# Patient Record
Sex: MALE | Race: WHITE | URBAN - METROPOLITAN AREA
[De-identification: names, ages, dates, MRNs, and addresses within clinical notes are randomized per-mention and may not be internally consistent; named-entity substitution may affect disease eponyms.]

---

## 2017-01-01 ENCOUNTER — TELEPHONE (OUTPATIENT)
Dept: FAMILY MEDICINE | Facility: CLINIC | Age: 82
End: 2017-01-01

## 2017-01-01 ENCOUNTER — OFFICE VISIT (OUTPATIENT)
Dept: FAMILY MEDICINE | Facility: CLINIC | Age: 82
End: 2017-01-01
Payer: COMMERCIAL

## 2017-01-01 ENCOUNTER — MEDICAL CORRESPONDENCE (OUTPATIENT)
Dept: HEALTH INFORMATION MANAGEMENT | Facility: CLINIC | Age: 82
End: 2017-01-01

## 2017-01-01 ENCOUNTER — CARE COORDINATION (OUTPATIENT)
Dept: CASE MANAGEMENT | Facility: CLINIC | Age: 82
End: 2017-01-01

## 2017-01-01 ENCOUNTER — RADIANT APPOINTMENT (OUTPATIENT)
Dept: MRI IMAGING | Facility: CLINIC | Age: 82
End: 2017-01-01
Attending: PSYCHIATRY & NEUROLOGY
Payer: COMMERCIAL

## 2017-01-01 ENCOUNTER — OFFICE VISIT (OUTPATIENT)
Dept: NEUROLOGY | Facility: CLINIC | Age: 82
End: 2017-01-01
Payer: COMMERCIAL

## 2017-01-01 ENCOUNTER — OFFICE VISIT (OUTPATIENT)
Dept: NEUROLOGY | Facility: CLINIC | Age: 82
End: 2017-01-01
Attending: FAMILY MEDICINE
Payer: COMMERCIAL

## 2017-01-01 ENCOUNTER — TELEPHONE (OUTPATIENT)
Dept: NEUROLOGY | Facility: CLINIC | Age: 82
End: 2017-01-01

## 2017-01-01 ENCOUNTER — RADIANT APPOINTMENT (OUTPATIENT)
Dept: GENERAL RADIOLOGY | Facility: CLINIC | Age: 82
End: 2017-01-01
Attending: FAMILY MEDICINE
Payer: COMMERCIAL

## 2017-01-01 VITALS
SYSTOLIC BLOOD PRESSURE: 148 MMHG | WEIGHT: 153 LBS | BODY MASS INDEX: 21.34 KG/M2 | HEART RATE: 85 BPM | DIASTOLIC BLOOD PRESSURE: 78 MMHG

## 2017-01-01 VITALS
SYSTOLIC BLOOD PRESSURE: 142 MMHG | DIASTOLIC BLOOD PRESSURE: 75 MMHG | HEART RATE: 64 BPM | OXYGEN SATURATION: 99 % | TEMPERATURE: 97 F

## 2017-01-01 VITALS — BODY MASS INDEX: 21.3 KG/M2 | SYSTOLIC BLOOD PRESSURE: 165 MMHG | WEIGHT: 152.7 LBS | DIASTOLIC BLOOD PRESSURE: 76 MMHG

## 2017-01-01 VITALS
BODY MASS INDEX: 21.2 KG/M2 | DIASTOLIC BLOOD PRESSURE: 73 MMHG | HEART RATE: 82 BPM | SYSTOLIC BLOOD PRESSURE: 131 MMHG | WEIGHT: 152 LBS | OXYGEN SATURATION: 98 % | TEMPERATURE: 97.2 F

## 2017-01-01 VITALS
TEMPERATURE: 97 F | WEIGHT: 150 LBS | BODY MASS INDEX: 20.92 KG/M2 | DIASTOLIC BLOOD PRESSURE: 76 MMHG | HEART RATE: 88 BPM | SYSTOLIC BLOOD PRESSURE: 132 MMHG

## 2017-01-01 DIAGNOSIS — Z53.9 DIAGNOSIS NOT YET DEFINED: Primary | ICD-10-CM

## 2017-01-01 DIAGNOSIS — G89.29 CHRONIC MIDLINE LOW BACK PAIN WITHOUT SCIATICA: ICD-10-CM

## 2017-01-01 DIAGNOSIS — G31.9 DIFFUSE CEREBRAL ATROPHY (H): Primary | ICD-10-CM

## 2017-01-01 DIAGNOSIS — R82.90 ABNORMAL URINALYSIS: ICD-10-CM

## 2017-01-01 DIAGNOSIS — R26.9 GAIT DISORDER: ICD-10-CM

## 2017-01-01 DIAGNOSIS — R26.9 GAIT DISORDER: Primary | ICD-10-CM

## 2017-01-01 DIAGNOSIS — G30.1 LATE ONSET ALZHEIMER'S DISEASE WITH BEHAVIORAL DISTURBANCE (H): ICD-10-CM

## 2017-01-01 DIAGNOSIS — M54.50 CHRONIC MIDLINE LOW BACK PAIN WITHOUT SCIATICA: Primary | ICD-10-CM

## 2017-01-01 DIAGNOSIS — M54.50 CHRONIC MIDLINE LOW BACK PAIN WITHOUT SCIATICA: ICD-10-CM

## 2017-01-01 DIAGNOSIS — G30.9 ALZHEIMER'S DEMENTIA WITHOUT BEHAVIORAL DISTURBANCE, UNSPECIFIED TIMING OF DEMENTIA ONSET: Primary | ICD-10-CM

## 2017-01-01 DIAGNOSIS — F02.818 LATE ONSET ALZHEIMER'S DISEASE WITH BEHAVIORAL DISTURBANCE (H): ICD-10-CM

## 2017-01-01 DIAGNOSIS — K21.00 GASTROESOPHAGEAL REFLUX DISEASE WITH ESOPHAGITIS: ICD-10-CM

## 2017-01-01 DIAGNOSIS — R29.6 RECURRENT FALLS: ICD-10-CM

## 2017-01-01 DIAGNOSIS — F02.80 ALZHEIMER'S DEMENTIA WITHOUT BEHAVIORAL DISTURBANCE, UNSPECIFIED TIMING OF DEMENTIA ONSET: Primary | ICD-10-CM

## 2017-01-01 DIAGNOSIS — G89.29 CHRONIC MIDLINE LOW BACK PAIN WITHOUT SCIATICA: Primary | ICD-10-CM

## 2017-01-01 LAB
ALBUMIN UR-MCNC: NEGATIVE MG/DL
APPEARANCE UR: CLEAR
BACTERIA SPEC CULT: NORMAL
BILIRUB UR QL STRIP: NEGATIVE
COLOR UR AUTO: YELLOW
GLUCOSE UR STRIP-MCNC: NEGATIVE MG/DL
HGB UR QL STRIP: NEGATIVE
HYALINE CASTS #/AREA URNS LPF: ABNORMAL /LPF
KETONES UR STRIP-MCNC: NEGATIVE MG/DL
LEUKOCYTE ESTERASE UR QL STRIP: ABNORMAL
METHYLMALONATE SERPL-SCNC: 0.42
NITRATE UR QL: NEGATIVE
NON-SQ EPI CELLS #/AREA URNS LPF: ABNORMAL /LPF
PH UR STRIP: 6 PH (ref 5–7)
RBC #/AREA URNS AUTO: ABNORMAL /HPF
SOURCE: ABNORMAL
SP GR UR STRIP: 1.02 (ref 1–1.03)
SPECIMEN SOURCE: NORMAL
TSH SERPL DL<=0.05 MIU/L-ACNC: 4.83 MU/L (ref 0.4–4)
UROBILINOGEN UR STRIP-ACNC: 0.2 EU/DL (ref 0.2–1)
VIT B12 SERPL-MCNC: 484 PG/ML (ref 193–986)
WBC #/AREA URNS AUTO: ABNORMAL /HPF

## 2017-01-01 PROCEDURE — 99203 OFFICE O/P NEW LOW 30 MIN: CPT | Performed by: PSYCHIATRY & NEUROLOGY

## 2017-01-01 PROCEDURE — 99213 OFFICE O/P EST LOW 20 MIN: CPT | Performed by: PSYCHIATRY & NEUROLOGY

## 2017-01-01 PROCEDURE — 87086 URINE CULTURE/COLONY COUNT: CPT | Performed by: FAMILY MEDICINE

## 2017-01-01 PROCEDURE — 99213 OFFICE O/P EST LOW 20 MIN: CPT | Performed by: FAMILY MEDICINE

## 2017-01-01 PROCEDURE — 84443 ASSAY THYROID STIM HORMONE: CPT | Performed by: PSYCHIATRY & NEUROLOGY

## 2017-01-01 PROCEDURE — 36415 COLL VENOUS BLD VENIPUNCTURE: CPT | Performed by: PSYCHIATRY & NEUROLOGY

## 2017-01-01 PROCEDURE — 72100 X-RAY EXAM L-S SPINE 2/3 VWS: CPT

## 2017-01-01 PROCEDURE — 99214 OFFICE O/P EST MOD 30 MIN: CPT | Performed by: FAMILY MEDICINE

## 2017-01-01 PROCEDURE — 81001 URINALYSIS AUTO W/SCOPE: CPT | Performed by: FAMILY MEDICINE

## 2017-01-01 PROCEDURE — 83921 ORGANIC ACID SINGLE QUANT: CPT | Mod: 90 | Performed by: PSYCHIATRY & NEUROLOGY

## 2017-01-01 PROCEDURE — 70551 MRI BRAIN STEM W/O DYE: CPT | Performed by: RADIOLOGY

## 2017-01-01 PROCEDURE — 82607 VITAMIN B-12: CPT | Performed by: PSYCHIATRY & NEUROLOGY

## 2017-01-01 PROCEDURE — 99000 SPECIMEN HANDLING OFFICE-LAB: CPT | Performed by: PSYCHIATRY & NEUROLOGY

## 2017-01-01 RX ORDER — ACETAMINOPHEN 500 MG
1000 TABLET ORAL EVERY 6 HOURS PRN
Qty: 100 TABLET | Refills: 0 | COMMUNITY
Start: 2017-01-01

## 2017-01-01 RX ORDER — OMEPRAZOLE 40 MG/1
40 CAPSULE, DELAYED RELEASE ORAL EVERY MORNING
Qty: 90 CAPSULE | Refills: 3 | Status: SHIPPED | OUTPATIENT
Start: 2017-01-01

## 2017-02-22 NOTE — MR AVS SNAPSHOT
"              After Visit Summary   2/22/2017    Darwin Curtis    MRN: [id number]           Patient Information     Date Of Birth          5/4/1923        Visit Information        Provider Department      2/22/2017 1:45 PM Frantz King MD [facility]        Care Instructions    Your provider has referred you to: [facility]: [facility] [phone]   [email or web address]    Call this number to schedule the neurology appointment(s) for further evaluation of your falls       Use a wedge pillow to sleep on.  They are available at [employer] or medical supply stores        Follow-ups after your visit        Who to contact     If you have questions or need follow up information about today's clinic visit or your schedule please contact [facility] directly at [phone].  Normal or non-critical lab and imaging results will be communicated to you by MyChart, letter or phone within 4 business days after the clinic has received the results. If you do not hear from us within 7 days, please contact the clinic through [employer] or phone. If you have a critical or abnormal lab result, we will notify you by phone as soon as possible.  Submit refill requests through [employer] or call your pharmacy and they will forward the refill request to us. Please allow 3 business days for your refill to be completed.          Additional Information About Your Visit        [employer] Information     [employer] lets you send messages to your doctor, view your test results, renew your prescriptions, schedule appointments and more. To sign up, go to [email or web address] . Click on \"Log in\" on the left side of the screen, which will take you to the Welcome page. Then click on \"Sign up Now\" on the right side of the page.     You will be asked to enter the access code listed below, as well as some personal information. Please follow " the directions to create your username and password.     Your access code is: [email or web address]  Expires: 2017  2:26 PM     Your access code will  in 90 days. If you need help or a new code, please call your [facility] clinic or [phone].        Care EveryWhere ID     This is your Care EveryWhere ID. This could be used by other organizations to access your [facility] medical records  [id number]        Your Vitals Were     Pulse Temperature BMI (Body Mass Index)             88 97  F (36.1  C) (Oral) 20.92 kg/m2          Blood Pressure from Last 3 Encounters:   17 132/76   16 132/70   16 145/77    Weight from Last 3 Encounters:   17 150 lb (68 kg)   16 147 lb (66.7 kg)   10/03/16 151 lb (68.5 kg)              Today, you had the following     No orders found for display       Primary Care Provider Office Phone # Fax #    Farntz King [phone]       [facility] [address] [facility] [address]        Thank you!     Thank you for choosing [facility]  for your care. Our goal is always to provide you with excellent care. Hearing back from our patients is one way we can continue to improve our services. Please take a few minutes to complete the written survey that you may receive in the mail after your visit with us. Thank you!             Your Updated Medication List - Protect others around you: Learn how to safely use, store and throw away your medicines at www.disposemymeds.org.          This list is accurate as of: 17  2:26 PM.  Always use your most recent med list.                   Brand Name Dispense Instructions for use    amLODIPine 10 MG tablet    NORVASC    90 tablet    Take 1 tablet (10 mg) by mouth daily       aspirin 81 MG tablet      1 TABLET DAILY       cholecalciferol 1000 UNIT tablet    vitamin D     Take 1,000 Units by mouth daily       metoprolol 100 MG 24 hr tablet    TOPROL-XL    90 tablet    Take 1  tablet (100 mg) by mouth daily       Multi-vitamin Tabs tablet      Take 1 tablet by mouth daily       order for DME     1 Device    Please dispense one pair of crutches

## 2017-02-22 NOTE — PATIENT INSTRUCTIONS
Your provider has referred you to: [facility]: [facility] [phone]   [email or web address]    Call this number to schedule the neurology appointment(s) for further evaluation of your falls       Use a wedge pillow to sleep on.  They are available at [employer] or medical supply stores

## 2017-02-22 NOTE — PROGRESS NOTES
SUBJECTIVE:  Darwin Curtis is a 89 year old male here for removal of sutures .   The laceration of left frontal scalp  occurred 5 days ago.  The sutures were placed that day during a visit at [facility] .   The patient denies any problems with the wound. he denies fever or any drainage.    OBJECTIVE: There were 5 simple interrupted sutures in the laceration. The wound appears well healed.     ASSESSMENT: Laceration follow up in care. The sutureswas/were removed without problems.    PLAN: Keep the wound moist with antibiotic ointment and covered until it is well healed. Avoid submersion of and stress on the wound until it is well healed. Follow up as needed.    --------------------------------------------------------------------------------------------------------------------------------------  SUBJECTIVE:  Darwin Curtis is a 89 year old male who scheduled an appointment to discuss the following issues:  He had anothert fall this morning. He reports that he does not know why he is having these recurrent falls.  He denies any injury from his fall this morning.   He denies any specific weakness.    Past Medical, social, family histories, medications, and allergies reviewed and updated      Current Outpatient Prescriptions:      metoprolol (TOPROL-XL) 100 MG 24 hr tablet, Take 1 tablet (100 mg) by mouth daily, Disp: 90 tablet, Rfl: 3     amLODIPine (NORVASC) 10 MG tablet, Take 1 tablet (10 mg) by mouth daily, Disp: 90 tablet, Rfl: 3     order for DME, Please dispense one pair of crutches, Disp: 1 Device, Rfl: 0     cholecalciferol (VITAMIN D) 1000 UNIT tablet, Take 1,000 Units by mouth daily, Disp: , Rfl:      multivitamin, therapeutic with minerals (MULTI-VITAMIN) TABS, Take 1 tablet by mouth daily, Disp: , Rfl:      ASPIRIN 81 MG OR TABS, 1 TABLET DAILY, Disp: , Rfl:     OBJECTIVE:  /76 (BP Location: Right arm, Cuff Size: Adult Regular)  Pulse 88  Temp 97  F (36.1  C) (Oral)  Wt 150 lb (68 kg)  BMI 20.92  kg/m2    EXAM:  GENERAL APPEARANCE: healthy, alert and no distress    NEURO: he has no poverty of speech but his mechanical production of sounds seems abnormal    Normal strength and tone, sensory exam grossly normal, mentation intact,   Positive dysdiadochokinesis  Finger to finger was slow and lacked coordination        ASSESSMENT/PLAN:  I reviewed my last note, when I evaluated the patient for this I believe he is having a balance issue and I would once again asked that they be seen by neurology. The information for the referral is given to the patient and his wife.

## 2017-02-22 NOTE — NURSING NOTE
"Chief Complaint   Patient presents with     Suture Removal       Initial /76 (BP Location: Right arm, Cuff Size: Adult Regular)  Pulse 88  Temp 97  F (36.1  C) (Oral)  Wt 150 lb (68 kg)  BMI 20.92 kg/m2 Estimated body mass index is 20.92 kg/(m^2) as calculated from the following:    Height as of 7/6/15: 5' 11\" (1.803 m).    Weight as of this encounter: 150 lb (68 kg).  Medication Reconciliation: sima Walker M.A.    "

## 2017-02-28 NOTE — LETTER
"2/28/2017       RE: Darwin Curtis  [address] [place] [address]     Dear Colleague,    Thank you for referring your patient, Darwin Curtis[facility]. Please see a copy of my visit note below.    HISTORY OF PRESENT ILLNESS:  Darwin Curtis is a 89-year-old right-handed man seen at the request of Dr. King for neurologic consultation with chief complaint of gait imbalance.  He is here with his wife, Hansa.  He has been falling down.  It happens in the house at least a couple of times each week.  She says that he \"does not  his feet.\"  He says that when he does fall he \"cannot get up off the floor.\"  His son lives 1/4 mile away and comes over and helps.  He can get out of a chair with the use of his arms.  He has no problem rolling over in bed.  He does not have tremor.  His handwriting has deteriorated in the last year or so.  Also, his wife has noticed that his speech has gotten a headache in the last year.  He has problems swallowing he says because his mouth was dry.  He thinks that he is weak in his arms and that he is \"losing all over.\"  He has had problems with urinary incontinence and is seeing Urology in this regard.  He denies neck pain or low back pain.  In the fall of last year he was walking 100 feet to the mailbox, but has not been doing that this winter.  His wife thinks that he might be able to do that once the weather gets better.  His vision is fading.      PAST MEDICAL HISTORY:  Significant for high blood pressure.  He denies diabetes, thyroid or asthma.  He did have a fall recently and had to have stitches placed in his forehead.  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  He does not drink or smoke and has no history of other head trauma except as related to the falls as noted.      SOCIAL HISTORY:  He is a retired .  They have 1 " "child.      FAMILY HISTORY:  Noncontributory.      PHYSICAL EXAMINATION:   GENERAL:  The patient is cooperative and in no distress.   VITAL SIGNS:  His blood pressure is 165/76.   NECK:  There are no carotid bruits.   HEART:  Auscultation of the heart shows S1, S2, without murmur, rub or gallop.   CHEST:  Clear to auscultation.   NEUROLOGIC:  He scored a 14/21 on a modified Mini-Mental Status.  He had problems with orientation, especially to date and less so to place.  He made mistakes spelling the word \"world\" backwards.  He could recall 2 of 3 words at 3 minutes with no clues but then could not remember the third word even with a clue.  Cranial nerve testing shows full visual fields to confrontation.  Funduscopic exam shows a sharp disk on the right.  I could not adequately see it on the left.  He does have evidence for amblyopia with the right eye tending to show esotropia.  Otherwise, eye movements are conjugate.  Pupils react sluggishly if at all to light.  Fundus on the left could not be adequately visualized.  Facies are symmetric.  His voice has a dysarthric quality.  Tongue protrudes in the midline.  Motor evaluation shows no pronator drift.  Finger tapping appears symmetric and finger-nose-finger is without ataxia.  He has weakness proximally in the left arm at the shoulder but this could be a shoulder problem.  Otherwise, he seems to have well-preserved strength in elbow flexion and extension and in wrist extension and in his fingers as well.  Strength in the legs and feet appears normal.  Muscle stretch reflexes are present and symmetric.  Toes are downgoing.  Sensory exam shows marked impairment and vibration at toes and at the ankles bilaterally.  Temperature sense cannot be adequately tested because of difficulty with his hearing.  Modalities to temperature and vibration in the hands are preserved.  His gait has a staggering quality.  I walked with him in the hallway and was assisting him holding onto " his 1 arm.  His right leg suddenly seemed to catch or give out on him and he stumbled off to the right side.  He did not fall but his wife says this is the sort of thing that happens.      ASSESSMENT:  Gait disorder.      DISCUSSION:  This patient is seen for evaluation of gait disorder and falls.  His exam does show him to walk on a somewhat wide base with a staggering kind of gait.  He has marked impairment of vibratory sense in the toes.  This could indicate posterior column dysfunction which could contribute to gait disorder.  For evaluation I am going to obtain MRI imaging of the brain to rule out any structural lesions.  I will be checking labs including B12 and methylmalonic acid and TSH.  I will see him in followup in a few weeks for reexamination.         RAFA LONGO MD             D: 2017 15:00   T: 2017 16:11   MT: #150      Name:     JANICE PEREZ   MRN:      [id number]        Account:      [id number]   :      1923           Visit Date:   2017      Document: [id number]        3/1 Addendum: Reviewed labs with wife.  B12 480, TSH slightly elevated at 4.8.  Awaiting MRI.     Again, thank you for allowing me to participate in the care of your patient.      Sincerely,    Rafa Longo MD

## 2017-02-28 NOTE — NURSING NOTE
"Darwin Curtis's goals for this visit include:   Chief Complaint   Patient presents with     Consult     frequent falls        He requests these members of his care team be copied on today's visit information: yes     PCP: Frantz King    Referring Provider:  Frantz King MD  [facility]  [address] [place] [address]    Chief Complaint   Patient presents with     Consult     frequent falls        Initial /76  Wt 69.3 kg (152 lb 11.2 oz)  BMI 21.3 kg/m2 Estimated body mass index is 21.3 kg/(m^2) as calculated from the following:    Height as of 7/6/15: 1.803 m (5' 11\").    Weight as of this encounter: 69.3 kg (152 lb 11.2 oz).  Medication Reconciliation: complete    Do you need any medication refills at today's visit?       "

## 2017-02-28 NOTE — MR AVS SNAPSHOT
After Visit Summary   2/28/2017    Darwin Curtis    MRN: [id number]           Patient Information     Date Of Birth          5/4/1923        Visit Information        Provider Department      2/28/2017 2:00 PM Ephraim Longo MD [facility]        Today's Diagnoses     Gait disorder    -  1      Care Instructions    Please wear comfortable clothes on the day of MRI. No metal or valuables.         Follow-ups after your visit        Follow-up notes from your care team     Return in about 4 weeks (around 3/28/2017).      Your next 10 appointments already scheduled     Mar 09, 2017 11:00 AM CST   MR BRAIN W/O CONTRAST with MGMR1   [facility] ([facility]    [address] [place] [address] [phone]           Take your medicines as usual, unless your doctor tells you not to. Bring a list of your current medicines to your exam (including vitamins, minerals and over-the-counter drugs). Also bring the results of similar scans you may have had.  Please remove any body piercings and hair extensions before you arrive.  Follow your doctor s orders. If you do not, we may have to postpone your exam.  You will not have contrast for this exam. You do not need to do anything special to prepare.  The MRI machine uses a strong magnet. Please wear clothes without metal (snaps, zippers). A sweatsuit works well, or we may give you a hospital gown.   **IMPORTANT** THE INSTRUCTIONS BELOW ARE ONLY FOR THOSE PATIENTS WHO HAVE BEEN TOLD THEY WILL RECEIVE SEDATION OR GENERAL ANESTHESIA DURING THEIR MRI PROCEDURE:  IF YOU WILL RECEIVE SEDATION (take medicine to help you relax during your exam):   You must get the medicine from your doctor before you arrive. Bring the medicine to the exam. Do not take it at home.   Arrive one hour early. Bring someone who can take you home after the test. Your medicine will make you sleepy. After the exam, you may not  drive, take a bus or take a taxi by yourself.   No eating 8 hours before your exam. You may have clear liquids up until 4 hours before your exam. (Clear liquids include water, clear tea, black coffee and fruit juice without pulp.)  IF YOU WILL RECEIVE ANESTHESIA (be asleep for your exam):   Arrive 1 1/2 hours early. Bring someone who can take you home after the test. You may not drive, take a bus or take a taxi by yourself.   No eating 8 hours before your exam. You may have clear liquids up until 4 hours before your exam. (Clear liquids include water, clear tea, black coffee and fruit juice without pulp.)   You will spend four to five hours in the recovery room.  Please call the Imaging Department at your exam site with any questions.            Apr 11, 2017  1:30 PM CDT   Return Visit with Ephraim Longo MD   [facility]    [address] [place] [id number] [phone]              Future tests that were ordered for you today     Open Future Orders        Priority Expected Expires Ordered    Vitamin B12 Routine  2/28/2018 2/28/2017    MR Brain w/o Contrast Routine  2/28/2018 2/28/2017            Who to contact     If you have questions or need follow up information about today's clinic visit or your schedule please contact [facility] directly at [phone].  Normal or non-critical lab and imaging results will be communicated to you by MyChart, letter or phone within 4 business days after the clinic has received the results. If you do not hear from us within 7 days, please contact the clinic through MyChart or phone. If you have a critical or abnormal lab result, we will notify you by phone as soon as possible.  Submit refill requests through [employer] or call your pharmacy and they will forward the refill request to us. Please allow 3 business days for your refill to be completed.          Additional Information About Your  Visit        [employer] Information     [employer] is an electronic gateway that provides easy, online access to your medical records. With [employer], you can request a clinic appointment, read your test results, renew a prescription or communicate with your care team.     To sign up for [employer] visit the website at [email or web address]   You will be asked to enter the access code listed below, as well as some personal information. Please follow the directions to create your username and password.     Your access code is: [email or web address]  Expires: 2017  2:26 PM     Your access code will  in 90 days. If you need help or a new code, please contact your [facility] or call [phone] for assistance.        Care EveryWhere ID     This is your Care EveryWhere ID. This could be used by other organizations to access your [place] medical records  [id number]        Your Vitals Were     BMI (Body Mass Index)                   21.3 kg/m2            Blood Pressure from Last 3 Encounters:   17 165/76   17 132/76   16 132/70    Weight from Last 3 Encounters:   17 69.3 kg (152 lb 11.2 oz)   17 68 kg (150 lb)   16 66.7 kg (147 lb)              We Performed the Following     Methylmalonic acid     TSH        Primary Care Provider Office Phone # Fax #    Frantz King [phone]       [address] [address]        Thank you!     Thank you for choosing [facility]  for your care. Our goal is always to provide you with excellent care. Hearing back from our patients is one way we can continue to improve our services. Please take a few minutes to complete the written survey that you may receive in the mail after your visit with us. Thank you!             Your Updated Medication List - Protect others around you: Learn how to safely use, store and throw away your medicines at  www.disposemymeds.org.          This list is accurate as of: 2/28/17  2:58 PM.  Always use your most recent med list.                   Brand Name Dispense Instructions for use    amLODIPine 10 MG tablet    NORVASC    90 tablet    Take 1 tablet (10 mg) by mouth daily       aspirin 81 MG tablet      1 TABLET DAILY       cholecalciferol 1000 UNIT tablet    vitamin D     Take 1,000 Units by mouth daily       metoprolol 100 MG 24 hr tablet    TOPROL-XL    90 tablet    Take 1 tablet (100 mg) by mouth daily       Multi-vitamin Tabs tablet      Take 1 tablet by mouth daily       omeprazole 40 MG capsule    priLOSEC    90 capsule    Take 1 capsule (40 mg) by mouth every morning Take 30-60 minutes before a meal.       order for DME     1 Device    Please dispense one pair of crutches

## 2017-02-28 NOTE — PROGRESS NOTES
"HISTORY OF PRESENT ILLNESS:  Darwin Curtis is a 89-year-old right-handed man seen at the request of Dr. King for neurologic consultation with chief complaint of gait imbalance.  He is here with his wife, Hansa.  He has been falling down.  It happens in the house at least a couple of times each week.  She says that he \"does not  his feet.\"  He says that when he does fall he \"cannot get up off the floor.\"  His son lives 1/4 mile away and comes over and helps.  He can get out of a chair with the use of his arms.  He has no problem rolling over in bed.  He does not have tremor.  His handwriting has deteriorated in the last year or so.  Also, his wife has noticed that his speech has gotten a headache in the last year.  He has problems swallowing he says because his mouth was dry.  He thinks that he is weak in his arms and that he is \"losing all over.\"  He has had problems with urinary incontinence and is seeing Urology in this regard.  He denies neck pain or low back pain.  In the fall of last year he was walking 100 feet to the mailbox, but has not been doing that this winter.  His wife thinks that he might be able to do that once the weather gets better.  His vision is fading.      PAST MEDICAL HISTORY:  Significant for high blood pressure.  He denies diabetes, thyroid or asthma.  He did have a fall recently and had to have stitches placed in his forehead.  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  He does not drink or smoke and has no history of other head trauma except as related to the falls as noted.      SOCIAL HISTORY:  He is a retired .  They have 1 child.      FAMILY HISTORY:  Noncontributory.      PHYSICAL EXAMINATION:   GENERAL:  The patient is cooperative and in no distress.   VITAL SIGNS:  His blood pressure is 165/76.   NECK:  There are no carotid bruits.   HEART:  Auscultation of the heart shows S1, S2, without murmur, rub or gallop. " "  CHEST:  Clear to auscultation.   NEUROLOGIC:  He scored a 14/21 on a modified Mini-Mental Status.  He had problems with orientation, especially to date and less so to place.  He made mistakes spelling the word \"world\" backwards.  He could recall 2 of 3 words at 3 minutes with no clues but then could not remember the third word even with a clue.  Cranial nerve testing shows full visual fields to confrontation.  Funduscopic exam shows a sharp disk on the right.  I could not adequately see it on the left.  He does have evidence for amblyopia with the right eye tending to show esotropia.  Otherwise, eye movements are conjugate.  Pupils react sluggishly if at all to light.  Fundus on the left could not be adequately visualized.  Facies are symmetric.  His voice has a dysarthric quality.  Tongue protrudes in the midline.  Motor evaluation shows no pronator drift.  Finger tapping appears symmetric and finger-nose-finger is without ataxia.  He has weakness proximally in the left arm at the shoulder but this could be a shoulder problem.  Otherwise, he seems to have well-preserved strength in elbow flexion and extension and in wrist extension and in his fingers as well.  Strength in the legs and feet appears normal.  Muscle stretch reflexes are present and symmetric.  Toes are downgoing.  Sensory exam shows marked impairment and vibration at toes and at the ankles bilaterally.  Temperature sense cannot be adequately tested because of difficulty with his hearing.  Modalities to temperature and vibration in the hands are preserved.  His gait has a staggering quality.  I walked with him in the hallway and was assisting him holding onto his 1 arm.  His right leg suddenly seemed to catch or give out on him and he stumbled off to the right side.  He did not fall but his wife says this is the sort of thing that happens.      ASSESSMENT:  Gait disorder.      DISCUSSION:  This patient is seen for evaluation of gait disorder and " falls.  His exam does show him to walk on a somewhat wide base with a staggering kind of gait.  He has marked impairment of vibratory sense in the toes.  This could indicate posterior column dysfunction which could contribute to gait disorder.  For evaluation I am going to obtain MRI imaging of the brain to rule out any structural lesions.  I will be checking labs including B12 and methylmalonic acid and TSH.  I will see him in followup in a few weeks for reexamination.         RAFA DELATORRE MD             D: 2017 15:00   T: 2017 16:11   MT: [id number]      Name:     JANICE PEREZ   MRN:      [id number]        Account:      [id number]   :      1923           Visit Date:   2017      Document: [id number]        3/1 Addendum: Reviewed labs with wife.  B12 480, TSH slightly elevated at 4.8.  Awaiting MRI.       3/13 Addendum: Reviewed MRI with wife.  There is atrophy, extensive gliosis.  These findings could relate to his sx.  F/u in April.

## 2017-03-06 NOTE — TELEPHONE ENCOUNTER
Hansa (wife) returning call from Dr. Longo. She can be reached at [phone]    Darla Severin-Brown, LPN

## 2017-03-06 NOTE — TELEPHONE ENCOUNTER
Reviewed with wife that MMA level somewhat elevated at 0.42.  Recommend B12 oral replacement 500 mcg/day.

## 2017-04-11 PROBLEM — F02.80 ALZHEIMER'S DISEASE (H): Status: ACTIVE | Noted: 2017-01-01

## 2017-04-11 PROBLEM — G30.9 ALZHEIMER'S DISEASE (H): Status: ACTIVE | Noted: 2017-01-01

## 2017-04-11 NOTE — MR AVS SNAPSHOT
After Visit Summary   2017    Darwin Curtis    MRN: [id number]           Patient Information     Date Of Birth          1923        Visit Information        Provider Department      2017 1:30 PM Ephraim Longo MD [facility]         Follow-ups after your visit        Follow-up notes from your care team     Return if symptoms worsen or fail to improve.      Who to contact     If you have questions or need follow up information about today's clinic visit or your schedule please contact [facility] directly at [phone].  Normal or non-critical lab and imaging results will be communicated to you by TheShoppingProhart, letter or phone within 4 business days after the clinic has received the results. If you do not hear from us within 7 days, please contact the clinic through TheShoppingProhart or phone. If you have a critical or abnormal lab result, we will notify you by phone as soon as possible.  Submit refill requests through [employer] or call your pharmacy and they will forward the refill request to us. Please allow 3 business days for your refill to be completed.          Additional Information About Your Visit        MyChart Information     [employer] is an electronic gateway that provides easy, online access to your medical records. With [employer], you can request a clinic appointment, read your test results, renew a prescription or communicate with your care team.     To sign up for [employer] visit the website at [email or web address]   You will be asked to enter the access code listed below, as well as some personal information. Please follow the directions to create your username and password.     Your access code is: [email or web address]  Expires: 2017  3:26 PM     Your access code will  in 90 days. If you need help or a new code, please contact your [facility] or call [phone] for assistance.        Care EveryWhere ID      This is your Care EveryWhere ID. This could be used by other organizations to access your [place] medical records  [id number]        Your Vitals Were     Pulse BMI (Body Mass Index)                85 21.34 kg/m2           Blood Pressure from Last 3 Encounters:   04/11/17 148/78   02/28/17 165/76   02/22/17 132/76    Weight from Last 3 Encounters:   04/11/17 69.4 kg (153 lb)   02/28/17 69.3 kg (152 lb 11.2 oz)   02/22/17 68 kg (150 lb)              Today, you had the following     No orders found for display       Primary Care Provider Office Phone # Fax #    Frantz King [phone]       [facility] [address] [facility] [address]        Thank you!     Thank you for choosing [facility]  for your care. Our goal is always to provide you with excellent care. Hearing back from our patients is one way we can continue to improve our services. Please take a few minutes to complete the written survey that you may receive in the mail after your visit with us. Thank you!             Your Updated Medication List - Protect others around you: Learn how to safely use, store and throw away your medicines at www.disposemymeds.org.          This list is accurate as of: 4/11/17  1:55 PM.  Always use your most recent med list.                   Brand Name Dispense Instructions for use    amLODIPine 10 MG tablet    NORVASC    90 tablet    Take 1 tablet (10 mg) by mouth daily       aspirin 81 MG tablet      1 TABLET DAILY       cholecalciferol 1000 UNIT tablet    vitamin D     Take 1,000 Units by mouth daily       metoprolol 100 MG 24 hr tablet    TOPROL-XL    90 tablet    Take 1 tablet (100 mg) by mouth daily       Multi-vitamin Tabs tablet      Take 1 tablet by mouth daily       omeprazole 40 MG capsule    priLOSEC    90 capsule    Take 1 capsule (40 mg) by mouth every morning Take 30-60 minutes before a meal.       order for DME     1 Device    Please dispense one pair  of crutches

## 2017-04-11 NOTE — LETTER
4/11/2017      RE: Darwin Curtis  [address] [place] [address]       HISTORY OF PRESENT ILLNESS:  Darwin Curtis is seen in followup with gait disorder and cognitive impairment.  I initially saw the patient about 6 weeks ago.  He is here with his wife, Hansa.  Things are not going well.  She is his primary caregiver and she has to manage all of his activities.  She is worn out.  He goes to bed about 5:00 in the afternoon.  He is up to go to the bathroom, then he is up for good about 3:00 a.m.  He is worn out.  He also has a tendency to be stubborn.  He has been falling in the house.  He will not use a walker or cane, although in clinic today he is using his walker.      He did have lab work performed.  His vitamin B12 level was 480 and his methylmalonic acid was slightly elevated at 0.42.  He is on B12 replacement.  His TSH was also slightly elevated.  He did have an MRI scan of the brain performed.  I reviewed the findings with the patient and his wife.  There is marked atrophy.  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      PHYSICAL EXAMINATION:     GENERAL:  On exam today, the patient is cooperative but not completely insightful about this situation.   VITAL SIGNS:  His blood pressure is 148/78.     NEUROLOGIC:  There is nothing to add on exam.      ASSESSMENT:   1.  Dementia with gait problems.      DISCUSSION:  This patient is seen in followup with issues of gait disorder and falls, but also significant cognitive impairment.  He has dementia.  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  The main issue has to do with how is getting along at home.  His wife is his primary caregiver and she is worn out.  She does not think she can keep functioning at this level and caring for him by  herself.  Their son helps out occasionally as he lives nearby, but if he has his own commitments.  At this time, I have recommended to the wife that she give serious consideration to have the patient put in a nursing home where around the clock service can be provided for his care.  He is really not able to do that on an ongoing basis because she is getting worn out and at some point she will end up sick herself.  She has thought at length about this and is aware of the problems they are facing.  I told her it might mean that she would have to sell their current residence and she is aware of that.      I discussed these issues at length with the patient, but mainly with his wife.  She is going to be considering the matter and follow up with me on an as needed basis.         RAFA LONGO MD             D: 2017 14:09   T: 2017 16:36   MT: [id number]      Name:     JANICE PEREZ   MRN:      [id number]        Account:      [id number]   :      1923           Visit Date:   2017      Document: [id number]       Rafa Longo MD

## 2017-04-11 NOTE — NURSING NOTE
"Darwin Curtis's goals for this visit include:   Chief Complaint   Patient presents with     RECHECK       He requests these members of his care team be copied on today's visit information: yes     PCP: Frantz King    Referring Provider:  No referring provider defined for this encounter.    Chief Complaint   Patient presents with     RECHECK       Initial /78  Pulse 85  Wt 69.4 kg (153 lb)  BMI 21.34 kg/m2 Estimated body mass index is 21.34 kg/(m^2) as calculated from the following:    Height as of 7/6/15: 1.803 m (5' 11\").    Weight as of this encounter: 69.4 kg (153 lb).  Medication Reconciliation: complete    Do you need any medication refills at today's visit?       "

## 2017-04-11 NOTE — PROGRESS NOTES
HISTORY OF PRESENT ILLNESS:  Darwin Curtis is seen in followup with gait disorder and cognitive impairment.  I initially saw the patient about 6 weeks ago.  He is here with his wife, Hansa.  Things are not going well.  She is his primary caregiver and she has to manage all of his activities.  She is worn out.  He goes to bed about 5:00 in the afternoon.  He is up to go to the bathroom, then he is up for good about 3:00 a.m.  He is worn out.  He also has a tendency to be stubborn.  He has been falling in the house.  He will not use a walker or cane, although in clinic today he is using his walker.      He did have lab work performed.  His vitamin B12 level was 480 and his methylmalonic acid was slightly elevated at 0.42.  He is on B12 replacement.  His TSH was also slightly elevated.  He did have an MRI scan of the brain performed.  I reviewed the findings with the patient and his wife.  There is marked atrophy.  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      PHYSICAL EXAMINATION:     GENERAL:  On exam today, the patient is cooperative but not completely insightful about this situation.   VITAL SIGNS:  His blood pressure is 148/78.     NEUROLOGIC:  There is nothing to add on exam.      ASSESSMENT:   1.  Dementia with gait problems.      DISCUSSION:  This patient is seen in followup with issues of gait disorder and falls, but also significant cognitive impairment.  He has dementia.  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  The main issue has to do with how is getting along at home.  His wife is his primary caregiver and she is worn out.  She does not think she can keep functioning at this level and caring for him by herself.  Their son helps out occasionally as he lives nearby, but if he has his own  commitments.  At this time, I have recommended to the wife that she give serious consideration to have the patient put in a nursing home where around the clock service can be provided for his care.  He is really not able to do that on an ongoing basis because she is getting worn out and at some point she will end up sick herself.  She has thought at length about this and is aware of the problems they are facing.  I told her it might mean that she would have to sell their current residence and she is aware of that.      I discussed these issues at length with the patient, but mainly with his wife.  She is going to be considering the matter and follow up with me on an as needed basis.         RAAF DELATORRE MD             D: 2017 14:09   T: 2017 16:36   MT: FRANCES#[id number]      Name:     JANICE PEREZ   MRN:      [id number]        Account:      [id number]   :      1923           Visit Date:   2017      Document: [id number]

## 2017-06-29 NOTE — MR AVS SNAPSHOT
After Visit Summary   6/29/2017    Darwin Curtis    MRN: [id number]           Patient Information     Date Of Birth          5/4/1923        Visit Information        Provider Department      6/29/2017 2:45 PM Frantz King MD [facility]        Today's Diagnoses     Diffuse cerebral atrophy    -  1      Care Instructions    You will get a call from our care coordinators to help in the transition to the NURSING HOME. Please discuss with them any financial concerns.           Follow-ups after your visit        Additional Services     CARE COORDINATION REFERRAL       Services are provided by a care coordinator for people with complex needs such as; medical, social, or  financial issues.  The care coordinator works with the patient and their primary care provider to determine health goals, obtain resources, achieve outcomes, and develop care plans that help coordinate a patient's care.     REFERRAL REASON:   Disposition Planning, transition to a nursing home    Provide additional details for Care Coordination to best meet Patient's current needs: the patient is having progressive balance problems and multiple falls. He is having difficulty with speech. His wife is having difficulty taking care of him at home. They have a place picked out for him in [place].  Finances are a concern     Clinic Staff has discussed Care Coordination Referral with the Patient/Caregiver: yes                  Who to contact     If you have questions or need follow up information about today's clinic visit or your schedule please contact [facility] directly at [phone].  Normal or non-critical lab and imaging results will be communicated to you by MyChart, letter or phone within 4 business days after the clinic has received the results. If you do not hear from us within 7 days, please contact the clinic through MyChart or phone. If you have a critical or abnormal lab result, we will notify  "you by phone as soon as possible.  Submit refill requests through [employer] or call your pharmacy and they will forward the refill request to us. Please allow 3 business days for your refill to be completed.          Additional Information About Your Visit        Nukotoyshart Information     Touch[employer] Android POS lets you send messages to your doctor, view your test results, renew your prescriptions, schedule appointments and more. To sign up, go to [email or web address] . Click on \"Log in\" on the left side of the screen, which will take you to the Welcome page. Then click on \"Sign up Now\" on the right side of the page.     You will be asked to enter the access code listed below, as well as some personal information. Please follow the directions to create your username and password.     Your access code is: [email or web address]  Expires: 2017  3:19 PM     Your access code will  in 90 days. If you need help or a new code, please call your [facility] or [phone].        Care EveryWhere ID     This is your Care EveryWhere ID. This could be used by other organizations to access your [place] medical records  [id number]        Your Vitals Were     Pulse Temperature Pulse Oximetry BMI (Body Mass Index)          82 97.2  F (36.2  C) (Oral) 98% 21.2 kg/m2         Blood Pressure from Last 3 Encounters:   17 131/73   17 148/78   17 165/76    Weight from Last 3 Encounters:   17 152 lb (68.9 kg)   17 153 lb (69.4 kg)   17 152 lb 11.2 oz (69.3 kg)              We Performed the Following     CARE COORDINATION REFERRAL        Primary Care Provider Office Phone # Fax #    Frantz King [phone]       [facility] [address] [address] [facility] [address]        Equal Access to Services     JAIME KING : Toni Nova, waangelicada luqadaha, qaybta ingrid gonzalez . [facility] [phone].    ATENCIÓN: Si amina " español, tiene a beach disposición servicios gratuitos de asistencia lingüística. Isabel moscoso [phone].    We comply with applicable federal civil rights laws and [place] laws. We do not discriminate on the basis of race, color, national origin, age, disability sex, sexual orientation or gender identity.            Thank you!     Thank you for choosing [facility]  [facility]. Our goal is always to provide you with excellent care. Hearing back from our patients is one way we can continue to improve our services. Please take a few minutes to complete the written survey that you may receive in the mail after your visit with us. Thank you!             Your Updated Medication List - Protect others around you: Learn how to safely use, store and throw away your medicines at www.disposemymeds.org.          This list is accurate as of: 6/29/17  3:19 PM.  Always use your most recent med list.                   Brand Name Dispense Instructions for use Diagnosis    amLODIPine 10 MG tablet    NORVASC    90 tablet    Take 1 tablet (10 mg) by mouth daily    Essential hypertension with goal blood pressure less than 140/90       aspirin 81 MG tablet      1 TABLET DAILY        cholecalciferol 1000 UNIT tablet    vitamin D     Take 1,000 Units by mouth daily        metoprolol 100 MG 24 hr tablet    TOPROL-XL    90 tablet    Take 1 tablet (100 mg) by mouth daily    Essential hypertension with goal blood pressure less than 140/90       Multi-vitamin Tabs tablet      Take 1 tablet by mouth daily        omeprazole 40 MG capsule    priLOSEC    90 capsule    Take 1 capsule (40 mg) by mouth every morning Take 30-60 minutes before a meal.    Gastroesophageal reflux disease with esophagitis       order for DME     1 Device    Please dispense one pair of crutches    Hip pain, left

## 2017-06-29 NOTE — NURSING NOTE
"Chief Complaint   Patient presents with     Fall     x 2 weeks ago injury to right side      Hypertension     has been having elevated readings at different facilities       Initial /73  Pulse 82  Temp 97.2  F (36.2  C) (Oral)  Wt 152 lb (68.9 kg)  SpO2 98%  BMI 21.2 kg/m2 Estimated body mass index is 21.2 kg/(m^2) as calculated from the following:    Height as of 7/6/15: 5' 11\" (1.803 m).    Weight as of this encounter: 152 lb (68.9 kg).  Medication Reconciliation: complete   Lacey Walker M.A.      "

## 2017-06-29 NOTE — PROGRESS NOTES
SUBJECTIVE:  Darwin Curtis is a 89 year old male who scheduled an appointment to discuss the following issues:  He had a fall about a week ago. He hit his ribs on the floor. The pain is getting better  He has had multiple falls, maybe 20 in the last year. He is sleeping a lot. He is having trouble speaking.     Dr Longo is his neurologist.    He did an MRI of his head and there was signs of aging of the brain. He suggested that Anderson move to a NURSING HOME.         Past Medical, social, family histories, medications, and allergies reviewed and updated      Current Outpatient Prescriptions:      omeprazole (PRILOSEC) 40 MG capsule, Take 1 capsule (40 mg) by mouth every morning Take 30-60 minutes before a meal., Disp: 90 capsule, Rfl: 3     metoprolol (TOPROL-XL) 100 MG 24 hr tablet, Take 1 tablet (100 mg) by mouth daily, Disp: 90 tablet, Rfl: 3     amLODIPine (NORVASC) 10 MG tablet, Take 1 tablet (10 mg) by mouth daily, Disp: 90 tablet, Rfl: 3     order for DME, Please dispense one pair of crutches, Disp: 1 Device, Rfl: 0     cholecalciferol (VITAMIN D) 1000 UNIT tablet, Take 1,000 Units by mouth daily, Disp: , Rfl:      multivitamin, therapeutic with minerals (MULTI-VITAMIN) TABS, Take 1 tablet by mouth daily, Disp: , Rfl:      ASPIRIN 81 MG OR TABS, 1 TABLET DAILY, Disp: , Rfl:     OBJECTIVE:  /73  Pulse 82  Temp 97.2  F (36.2  C) (Oral)  Wt 152 lb (68.9 kg)  SpO2 98%  BMI 21.2 kg/m2    EXAM:  GENERAL APPEARANCE: healthy, alert and no distress  EYES: EOMI,  PERRL  HENT: ear canals and TM's normal and nose and mouth without ulcers or lesions  RESP: lungs clear to auscultation - no rales, rhonchi or wheezes  CHEST: there is a bruise on the right lateral chest wall over the lower ribs. It is not tender.   CV: regular rates and rhythm, normal S1 S2, no S3 or S4 and no murmur, click or rub -  ABDOMEN:  soft, nontender, no HSM or masses and bowel sounds normal    Results for orders placed or performed in  visit on 03/09/17   MR Brain w/o Contrast    Narrative    Brain MRI without contrast    History: gait disorder, Unspecified abnormalities of gait and  mobility.    Comparison: None    Technique: Sagittal T1-weighted, axial diffusion, FLAIR, T2-weighted,  and susceptibility images of the brain were obtained without  intravenous contrast.    Findings: Severe diffuse volume loss present, not greatly  disproportionate to the patient's age. Moderate degree of motion  throughout the examination.  There is no evidence of intra or extra-axial mass on these noncontrast  images. Regarding the presence of hemorrhage, there is no definite  acute intracranial hemorrhage, although there are multiple  microhemorrhages on T2 star images, approximately 7-8 total, present  in the left temporal lobe, brainstem, cerebellum bilaterally, left  thalamus, to name a few. Diffuse white matter abnormalities present,  severe in extent, and to a lesser degree throughout the internal  capsules and basal ganglia, likely related to severe underlying  leukoariosis. Remote lacunar infarcts present in the right  hemicerebellum. Given the degree of cerebral volume loss, there is no  evidence of hydrocephalus although the lateral ventricles are not  enlarged, as the third ventricle and fourth ventricles appear normal  in size relative to the volume loss. The cerebral volume loss does not  appear predominantly involving a particular location, although there  is focal prominence of the subarachnoid space and high right anterior  frontal lobe, particularly of the superior frontal sulcus.    The major intracranial vascular flow-voids are present. The visualized  portions of the paranasal sinuses and mastoid air cells are  unremarkable.      Impression    Impression:    1. Diffuse severe cerebral volume loss, not greatly disproportionate  to the patient's age, with severe leukoariosis. Underlying  microhemorrhages may be related.  2. Lacunar infarcts  throughout the right thalamus and right  hemicerebellum, remote. No acute infarct noted.  3. The diffuse leukoariosis and white matter disease is however  somewhat disproportionate to the patient's age.    HOANG JUNIOR MD         ASSESSMENT/PLAN:    (G31.9) Diffuse cerebral atrophy  (primary encounter diagnosis)  Comment:   Plan: CARE COORDINATION REFERRAL        To aid in nursing home placement.        His wife will take the phone call. They have an answering machine.

## 2017-06-29 NOTE — PATIENT INSTRUCTIONS
You will get a call from our care coordinators to help in the transition to the NURSING HOME. Please discuss with them any financial concerns.

## 2017-06-30 NOTE — PROGRESS NOTES
Clinic Care Coordination Contact  OUTREACH    Referral Information:  Referral Source: PCP  Reason for Contact: Nursing Home Placement  Care Conference: No     Universal Utilization:   ED Visits in last year: 1  Hospital visits in last year: 1  Last PCP appointment: 06/29/17  Missed Appointments: 0  Concerns: Proper Utilization  Multiple Providers or Specialists: Yes    Clinical Concerns:  Current Medical Concerns:   Patient Active Problem List   Diagnosis     Retention of urine     Malignant neoplasm of prostate (H)     CARDIOVASCULAR SCREENING; LDL GOAL LESS THAN 130     Amblyopia, mild-mod, od     Hx of strabismus repair, od     Macular degeneration (senile) of retina     Posterior vitreous detachment, ou     Cataract, mod, os > od     BPH (benign prostatic hyperplasia)     Advanced directives, counseling/discussion     CKD (chronic kidney disease) stage 3, GFR 30-59 ml/min     OA (osteoarthritis) of knee     Arthritis of knee, left     Health Care Home     Vitamin D deficiency     CTS (carpal tunnel syndrome)     S/P carpal tunnel release     Osteoarthritis of left knee     Overflow incontinence     Essential hypertension with goal blood pressure less than 140/90     Nonrheumatic aortic valve stenosis, repeat echo annually and prn symptoms     Gastroesophageal reflux disease with esophagitis     Alzheimer's disease       Current Behavioral Concerns: Memory loss, Lack of sleep, Falls    Education Provided to patient: Senior Linkage Line, Elder Law 's, LTC assessment process through the [facility], educated on MA and EW, Care Coordination  Clinical Pathway: None    Medication Management:  Pt is adherent, Spouse assists     Functional Status:  Mobility Status: Independent  Equipment Currently Used at Home: none  Transportation: Spouse provides     Psychosocial:  Current living arrangement:: I live in a private home with spouse  Financial/Insurance: [facility], discussed assistance through [facility] for  memory care, no concerns at present     Resources and Interventions:  Current Resources: No formal support  Advanced Care Plans/Directives on file:: No     Patient/Caregiver understanding: Call placed to patient's spouse. No release on file encouraged pt's spouse to complete one next time in clinic. No health care information discussed. Pt's spouse plans to move pt to [facility] on July 10th. Pt has a nursing assessment today to determine level of care. Pt's spouse will move his belongings in on the 7th. Discussed Elderly Waiver and payment options for memory care. Explained Care Coordination.   Frequency of Care Coordination: 1-2 weeks     Plan: ALEXANDER KEBEDE will follow-up after the 10 th to ensure smooth transition. Pt's spouse to call with any questions.     MARIA FERNANDA Lucia  [facility]   [phone]  [email or web address]

## 2017-07-07 NOTE — TELEPHONE ENCOUNTER
RN returned call to Ramon and notified her that the requested forms were faxed to the number provided about 30 minutes ago. Ramon placed this writer on hold and confirmed receipt of the requested forms. She states she does not need anything additional at this time.  Jayna Hester RN

## 2017-07-07 NOTE — TELEPHONE ENCOUNTER
She sent over a fax yesterday, patient will be moving into assisted living /memory care unit tomorrow. Cannot dispense meds to patient until paperwork is completed.     Needing orders signed and faxed back today.    , checked and we have received the paperwork.    Paperwork is on Dr. Frantz King desk.    Leah Acosta, OLINDAN RN

## 2017-07-07 NOTE — TELEPHONE ENCOUNTER
Ramon at [facility] still waiting for the paperwork to be faxed and sent over. This is needed today.  Please fax to [phone]

## 2017-07-20 NOTE — PROGRESS NOTES
Patient moved to [facility].     Sallie [facility]  Clinic Care Coordinator  [phone]  [email or web address]

## 2017-08-16 NOTE — TELEPHONE ENCOUNTER
Per Standing Order Protocol, Verbal Orders are provided for Home Care, Assisted Living or Nursing Home Evaluations and Treatments.    Per protocol, to provider as an FYI.  Carie Loaiza RN     , please fax immunization records and diagnosis per request below then route to Dr King.  Carie Loaiza RN

## 2017-08-16 NOTE — TELEPHONE ENCOUNTER
Faxed diagnosis list and immunizations to # given below. Will forward to Dr. King./Shalonda Gandara,

## 2017-08-16 NOTE — TELEPHONE ENCOUNTER
Caller needs verbal orders for physical therapy, patient was evaluated on Monday due to multiple falls need orders for twice a week for 3 weeks for gait, balance and appropriate use of walker. Also need patient immunization records and diagnosis list faxed to [phone]

## 2017-09-25 NOTE — TELEPHONE ENCOUNTER
Reason for Call:  Other order for flu shot    Detailed comments: spouse would like patient to have a flu shot, patient is in nursing home and wondering if pcp could send an order for this. Please call spouse to discuss    Phone Number Patient can be reached at: Home number on file [phone] (home)    Best Time:     Can we leave a detailed message on this number? YES    Call taken on 9/25/2017 at 1:49 PM by Tsering Bueno

## 2017-09-25 NOTE — TELEPHONE ENCOUNTER
Per wife, patient is at [facility] in [place].    Informed will call to give Verbal Orders.   [facility] phone number [phone]    Left message to have an RN call back to give orders.  Carie Loaiza RN

## 2017-09-26 NOTE — TELEPHONE ENCOUNTER
Spoke to Margie Navarro at [facility] is given Verbal Orders below.  Stated, doctor order is not necessary, primary care giver just needs to sign form.  Facility will be having a flu clinic.  Advise wife is confused and someone will need to communicate the work flow.  Verbalized good understanding.   Carie Loaiza RN

## 2017-10-25 NOTE — TELEPHONE ENCOUNTER
Information below is reviewed with Dr King, Verbal Orders okay for request below.  Verbal Orders are left on voice Siomaramail.    Per protocol, will route encounter to be cosigned by provider for Verbal Orders.  Carie Loaiza RN

## 2017-10-25 NOTE — TELEPHONE ENCOUNTER
I have discussed this patient's issue with the RN involved and we have come up with this plan.  Frantz King MD

## 2017-10-25 NOTE — TELEPHONE ENCOUNTER
Reason for Call:  Other     Detailed comments: please fax the diagnoses list to nursing home @ [phone]    Phone Number Patient can be reached at: Home number on file [phone] (home)    Best Time:     Can we leave a detailed message on this number? YES    Call taken on 10/25/2017 at 3:21 PM by Tsering Bueno

## 2017-11-02 NOTE — TELEPHONE ENCOUNTER
Reason for Call:  Other call back    Detailed comments: [facility] employee called and stated that patient has fall 3X in the last 24 hours and has had multiple falls before this as well. It is their policy that patient is to be seen. Please call Maria Fernandae at [facility] to set up appointment.     Phone Number Patient can be reached at: [phone]   Best Time: Any    Can we leave a detailed message on this number? YES on this number [phone]    Call taken on 11/2/2017 at 9:30 AM by Myriam Rust

## 2017-11-02 NOTE — TELEPHONE ENCOUNTER
Per Erica, Nurse at assisted living, memory care.  Patient has had 3 falls in 24 hours, 2 requiring sutures.  Prior to this multiple falls.  Had spoken to wife about long term facility.   Has sent notices with each fall.   Patient was evaluated in emergency department.  Patient did have Xrays for head and neck.   Wife does not want him to be brought in for evaluation.   Patient was evaluated at emergency department.       Per wife, does not have anyone to bring patient to clinic for her.  Son will accompany patient on 11/10/17.   Wife feels patient was evaluated multiple times in emergency department and is okay to wait until office visit 11/10/17    After discussion, wifeHansa agreed to schedule an appointment for patient tomorrow with Dr King.   Will call back if this does not work with son.  Erica is given plan.  If patient falls, advise to bring patient to emergency department due lack of transportation.   Verbalized good understanding.     Dr Fernando ALMAGUER, RN

## 2017-11-02 NOTE — TELEPHONE ENCOUNTER
Left message on answering machine for RN to call back.     [phone].    Appointment is schedule for today.  Next 5 appointments (look out 90 days)     Nov 02, 2017  2:30 PM CDT   [facility] Frantz King MD   [facility]    [address] [facility] [address]   [phone]            Nov 10, 2017 12:00 PM CST   Office Visit with Frantz King MD   [facility]    [address] [facility] [address]   [phone]                  Carie Loaiza RN

## 2017-11-03 PROBLEM — F02.818 LATE ONSET ALZHEIMER'S DISEASE WITH BEHAVIORAL DISTURBANCE (H): Status: ACTIVE | Noted: 2017-01-01

## 2017-11-03 PROBLEM — F02.80 ALZHEIMER'S DISEASE (H): Status: RESOLVED | Noted: 2017-01-01 | Resolved: 2017-01-01

## 2017-11-03 PROBLEM — G30.9 ALZHEIMER'S DISEASE (H): Status: RESOLVED | Noted: 2017-01-01 | Resolved: 2017-01-01

## 2017-11-03 PROBLEM — G30.1 LATE ONSET ALZHEIMER'S DISEASE WITH BEHAVIORAL DISTURBANCE (H): Status: ACTIVE | Noted: 2017-01-01

## 2017-11-03 NOTE — LETTER
[facility]  [address] [facility] [facility] [address] [phone]    November 6, 2017    Darwin Curtis  [address] [place] [address]            Dear Darwin,    I have reviewed the results of the laboratory tests that we recently ordered. All of the lab work performed was normal or considered normal for you.   Sincerely,   Frantz King MD/tripp    Results for orders placed or performed in visit on 11/03/17   UA with Microscopic   Result Value Ref Range    Color Urine Yellow     Appearance Urine Clear     Glucose Urine Negative NEG^Negative mg/dL    Bilirubin Urine Negative NEG^Negative    Ketones Urine Negative NEG^Negative mg/dL    Specific Gravity Urine 1.025 1.003 - 1.035    pH Urine 6.0 5.0 - 7.0 pH    Protein Albumin Urine Negative NEG^Negative mg/dL    Urobilinogen Urine 0.2 0.2 - 1.0 EU/dL    Nitrite Urine Negative NEG^Negative    Blood Urine Negative NEG^Negative    Leukocyte Esterase Urine Trace (A) NEG^Negative    Source Midstream Urine     WBC Urine 2-5 (A) OTO2^O - 2 /HPF    RBC Urine O - 2 OTO2^O - 2 /HPF    Hyaline Casts 2-5 (A) OTO2^O - 2 /LPF    Squamous Epithelial /LPF Urine Few FEW^Few /LPF   Urine Culture Aerobic Bacterial   Result Value Ref Range    Specimen Description Midstream Urine     Culture Micro       <10,000 colonies/mL  mixed urogenital malena  Susceptibility testing not routinely done

## 2017-11-03 NOTE — NURSING NOTE
"Chief Complaint   Patient presents with     Hospital F/U     multipe falls, [facility]. two of the falls required sutures.     Forms     for assisted living, they would like him checked for uti and wants to have an order for tylenol prn       Initial /75  Pulse 64  Temp 97  F (36.1  C) (Oral)  SpO2 99% Estimated body mass index is 21.2 kg/(m^2) as calculated from the following:    Height as of 7/6/15: 5' 11\" (1.803 m).    Weight as of 6/29/17: 152 lb (68.9 kg).  Medication Reconciliation: complete     Regina Brown cma      "

## 2017-11-03 NOTE — MR AVS SNAPSHOT
After Visit Summary   11/3/2017    Darwin Curtis    MRN: [id number]           Patient Information     Date Of Birth          5/4/1923        Visit Information        Provider Department      11/3/2017 1:30 PM Frantz King MD [facility]        Today's Diagnoses     Chronic midline low back pain without sciatica    -  1    Recurrent falls        Abnormal urinalysis        Late onset Alzheimer's disease with behavioral disturbance           Follow-ups after your visit        Your next 10 appointments already scheduled     Nov 10, 2017 12:00 PM CST   [facility]z King [facility]   [facility]    [address] [facility] [address] [phone]           Bring a current list of meds and any records pertaining to this visit. For Physicals, please bring immunization records and any forms needing to be filled out. Please arrive 10 minutes early to complete paperwork.              Who to contact     If you have questions or need follow up information about today's clinic visit or your schedule please contact [facility] [place] directly at [phone].  Normal or non-critical lab and imaging results will be communicated to you by ManyWhohart, letter or phone within 4 business days after the clinic has received the results. If you do not hear from us within 7 days, please contact the clinic through Switchboardt or phone. If you have a critical or abnormal lab result, we will notify you by phone as soon as possible.  Submit refill requests through MiNOWireless or call your pharmacy and they will forward the refill request to us. Please allow 3 business days for your refill to be completed.          Additional Information About Your Visit        MyChart Information     MiNOWireless lets you send messages to your doctor, view your test results, renew your prescriptions, schedule appointments and more. To sign up, go to [email or web address] .  "Click on \"Log in\" on the left side of the screen, which will take you to the Welcome page. Then click on \"Sign up Now\" on the right side of the page.     You will be asked to enter the access code listed below, as well as some personal information. Please follow the directions to create your username and password.     Your access code is: [email or web address]  Expires: 2018  4:26 PM     Your access code will  in 90 days. If you need help or a new code, please call your [place] clinic or [phone].        Care EveryWhere ID     This is your Care EveryWhere ID. This could be used by other organizations to access your [place] medical records  [id number]        Your Vitals Were     Pulse Temperature Pulse Oximetry             64 97  F (36.1  C) (Oral) 99%          Blood Pressure from Last 3 Encounters:   17 142/75   17 131/73   17 148/78    Weight from Last 3 Encounters:   17 152 lb (68.9 kg)   17 153 lb (69.4 kg)   17 152 lb 11.2 oz (69.3 kg)              We Performed the Following     UA with Microscopic     Urine Culture Aerobic Bacterial          Today's Medication Changes          These changes are accurate as of: 11/3/17  4:26 PM.  If you have any questions, ask your nurse or doctor.               Start taking these medicines.        Dose/Directions    acetaminophen 500 MG tablet   Commonly known as:  TYLENOL   Used for:  Abnormal urinalysis   Started by:  Frantz King MD        Dose:  1000 mg   Take 2 tablets (1,000 mg) by mouth every 6 hours as needed for mild pain   Quantity:  100 tablet   Refills:  0         Stop taking these medicines if you haven't already. Please contact your care team if you have questions.     order for DME   Stopped by:  Frantz King MD                Where to get your medicines      Some of these will need a paper prescription and others can be bought over the counter.  Ask your nurse if you have questions.     You don't need a " prescription for these medications     acetaminophen 500 MG tablet                Primary Care Provider Office Phone # Fax #    Frantz King [phone] [address] [facility] [address]        Equal Access to Services     JAIME KING : Hadkirill curt grayson mann Sobrianna, waaxda luqadaha, qaybta kaalmada adenatashayada, ingrid burton laFlorenciojasmin cummins. So [facility] [phone].    ATENCIÓN: Si habla español, tiene a beach disposición servicios gratuitos de asistencia lingüística. Llame al [phone].    We comply with applicable federal civil rights laws and [place] laws. We do not discriminate on the basis of race, color, national origin, age, disability, sex, sexual orientation, or gender identity.            Thank you!     Thank you for choosing [facility]  for your care. Our goal is always to provide you with excellent care. Hearing back from our patients is one way we can continue to improve our services. Please take a few minutes to complete the written survey that you may receive in the mail after your visit with us. Thank you!             Your Updated Medication List - Protect others around you: Learn how to safely use, store and throw away your medicines at www.disposemymeds.org.          This list is accurate as of: 11/3/17  4:26 PM.  Always use your most recent med list.                   Brand Name Dispense Instructions for use Diagnosis    acetaminophen 500 MG tablet    TYLENOL    100 tablet    Take 2 tablets (1,000 mg) by mouth every 6 hours as needed for mild pain    Abnormal urinalysis       amLODIPine 10 MG tablet    NORVASC    90 tablet    Take 1 tablet (10 mg) by mouth daily    Essential hypertension with goal blood pressure less than 140/90       aspirin 81 MG tablet      1 TABLET DAILY        cholecalciferol 1000 UNIT tablet    vitamin D3     Take 1,000 Units by mouth daily        metoprolol 100 MG 24 hr tablet    TOPROL-XL    90 tablet    Take 1  tablet (100 mg) by mouth daily    Essential hypertension with goal blood pressure less than 140/90       Multi-vitamin Tabs tablet      Take 1 tablet by mouth daily        omeprazole 40 MG capsule    priLOSEC    90 capsule    Take 1 capsule (40 mg) by mouth every morning Take 30-60 minutes before a meal.    Gastroesophageal reflux disease with esophagitis

## 2017-11-03 NOTE — PROGRESS NOTES
SUBJECTIVE:  Darwin Curtis is a 89 year old male who scheduled an appointment to discuss the following issues:  He had a fall on Monday of this week.   He went to the emergency department and got staples in the right scalp this time.     He denies any headache(s).    He is having back pain all the tme    He has had multiple falls and twice he had to go to the emergency department. He got staples in his scalp both times.     The assisted living was concerned about the possible(miguelito) of a UTI.     He is incontinence and wears a adult diaper.   He denies any dysuria/         Past Medical, social, family histories, medications, and allergies reviewed and updated   ROS: other than that noted above all other review of systems was negative    ROS:       Current Outpatient Prescriptions:      omeprazole (PRILOSEC) 40 MG capsule, Take 1 capsule (40 mg) by mouth every morning Take 30-60 minutes before a meal., Disp: 90 capsule, Rfl: 3     metoprolol (TOPROL-XL) 100 MG 24 hr tablet, Take 1 tablet (100 mg) by mouth daily, Disp: 90 tablet, Rfl: 3     amLODIPine (NORVASC) 10 MG tablet, Take 1 tablet (10 mg) by mouth daily, Disp: 90 tablet, Rfl: 3     cholecalciferol (VITAMIN D) 1000 UNIT tablet, Take 1,000 Units by mouth daily, Disp: , Rfl:      multivitamin, therapeutic with minerals (MULTI-VITAMIN) TABS, Take 1 tablet by mouth daily, Disp: , Rfl:      ASPIRIN 81 MG OR TABS, 1 TABLET DAILY, Disp: , Rfl:     OBJECTIVE:  /75  Pulse 64  Temp 97  F (36.1  C) (Oral)  SpO2 99%    EXAM:  GENERAL APPEARANCE: healthy, alert and no distress  EYES: EOMI,  PERRL  HENT: ear canals and TM's normal and nose and mouth without ulcers or lesions  RESP: lungs clear to auscultation - no rales, rhonchi or wheezes  CV: regular rates and rhythm, normal S1 S2, no S3 or S4 and no murmur, click or rub -  ABDOMEN:  soft, nontender, no HSM or masses and bowel sounds normal  BACK: no tenderness to percussion of the vertebrae    Results for orders  placed or performed in visit on 03/09/17   MR Brain w/o Contrast    Narrative    Brain MRI without contrast    History: gait disorder, Unspecified abnormalities of gait and  mobility.    Comparison: None    Technique: Sagittal T1-weighted, axial diffusion, FLAIR, T2-weighted,  and susceptibility images of the brain were obtained without  intravenous contrast.    Findings: Severe diffuse volume loss present, not greatly  disproportionate to the patient's age. Moderate degree of motion  throughout the examination.  There is no evidence of intra or extra-axial mass on these noncontrast  images. Regarding the presence of hemorrhage, there is no definite  acute intracranial hemorrhage, although there are multiple  microhemorrhages on T2 star images, approximately 7-8 total, present  in the left temporal lobe, brainstem, cerebellum bilaterally, left  thalamus, to name a few. Diffuse white matter abnormalities present,  severe in extent, and to a lesser degree throughout the internal  capsules and basal ganglia, likely related to severe underlying  leukoariosis. Remote lacunar infarcts present in the right  hemicerebellum. Given the degree of cerebral volume loss, there is no  evidence of hydrocephalus although the lateral ventricles are not  enlarged, as the third ventricle and fourth ventricles appear normal  in size relative to the volume loss. The cerebral volume loss does not  appear predominantly involving a particular location, although there  is focal prominence of the subarachnoid space and high right anterior  frontal lobe, particularly of the superior frontal sulcus.    The major intracranial vascular flow-voids are present. The visualized  portions of the paranasal sinuses and mastoid air cells are  unremarkable.      Impression    Impression:    1. Diffuse severe cerebral volume loss, not greatly disproportionate  to the patient's age, with severe leukoariosis. Underlying  microhemorrhages may be related.  2.  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 No acute infarct noted.  3. The diffuse leukoariosis and white matter disease is however  somewhat disproportionate to the patient's age.    HOANG JUNIOR MD     Results for orders placed or performed in visit on 11/03/17   UA with Microscopic   Result Value Ref Range    Color Urine Yellow     Appearance Urine Clear     Glucose Urine Negative NEG^Negative mg/dL    Bilirubin Urine Negative NEG^Negative    Ketones Urine Negative NEG^Negative mg/dL    Specific Gravity Urine 1.025 1.003 - 1.035    pH Urine 6.0 5.0 - 7.0 pH    Protein Albumin Urine Negative NEG^Negative mg/dL    Urobilinogen Urine 0.2 0.2 - 1.0 EU/dL    Nitrite Urine Negative NEG^Negative    Blood Urine Negative NEG^Negative    Leukocyte Esterase Urine Trace (A) NEG^Negative    Source Midstream Urine     WBC Urine 2-5 (A) OTO2^O - 2 /HPF    RBC Urine O - 2 OTO2^O - 2 /HPF    Hyaline Casts 2-5 (A) OTO2^O - 2 /LPF    Squamous Epithelial /LPF Urine Few FEW^Few /LPF     LUMBAR XRAY: scoliosis and degenerative changed     ASSESSMENT/PLAN:    (M54.5,  G89.29) Chronic midline low back pain without sciatica  (primary encounter diagnosis)  Comment:   Plan: XR Lumbar Spine 2/3 Views        Tylenol 1000 mg po every 6 hrs as needed   Follow up in 1 month(s)     (R29.6) Recurrent falls  Comment:   Plan: UA with Microscopic, Urine Culture Aerobic         Bacterial                      (G30.1,  F02.81) Late onset Alzheimer's disease with behavioral disturbance  Comment:   Plan: he is going to get some more monitoring at the assisted living home he may need to transition to a higher level of care and he can do that with in the facility that he is at/

## 2017-11-06 NOTE — PROGRESS NOTES
Darwin,  I have reviewed the results of the laboratory tests that we recently ordered. All of the lab work performed was normal or considered normal for you.  Sincerely,   Frantz King